# Patient Record
Sex: FEMALE | Race: BLACK OR AFRICAN AMERICAN
[De-identification: names, ages, dates, MRNs, and addresses within clinical notes are randomized per-mention and may not be internally consistent; named-entity substitution may affect disease eponyms.]

---

## 2018-06-05 NOTE — ER
Nurse's Notes                                                                                     

 [facility]                                                                

Name: Radha Blake                                                                             

Age: 27 yrs                                                                                       

Sex: Female                                                                                       

: 1990                                                                                   

MRN: [id number]                                                                                   

Arrival Date: 2018                                                                          

Time: 13:47                                                                                       

Account#: [id number]                                                                            

Bed 19                                                                                            

Private MD: Dawn Horn H                                                                    

Diagnosis: Nausea and vomiting;Diarrhea, unspecified                                              

                                                                                                  

Presentation:                                                                                     

                                                                                             

13:51 Presenting complaint: Patient states: N/V/D and body aches since 0200 today. Transition aa5 

      of care: patient was not received from another setting of care. Onset of symptoms was       

      2018. Risk Assessment: Do you want to hurt yourself or someone else? Patient       

      reports no desire to harm self or others. Initial Sepsis Screen: Does the patient meet      

      any 2 criteria? No. Patient's initial sepsis screen is negative. Does the patient have      

      a suspected source of infection? No. Patient's initial sepsis screen is negative. Care      

      prior to arrival: None.                                                                     

13:51 Method Of Arrival: Ambulatory                                                           aa5 

13:51 Acuity: MERRILL 3                                                                           aa5 

                                                                                                  

OB/GYN:                                                                                           

13:53 LMP 2018                                                                              aa5 

                                                                                                  

Historical:                                                                                       

- Allergies:                                                                                      

13:52 No Known Allergies;                                                                     aa5 

- PMHx:                                                                                           

13:53 Gestational diabetes;                                                                   aa5 

- PSHx:                                                                                           

13:52 Cholecystectomy; D \T\ C;                                                                 aa5

                                                                                                  

- Immunization history:: Adult Immunizations up to date.                                          

- Social history:: Smoking status: Patient/guardian denies using tobacco.                         

- Ebola Screening: : No symptoms or risks identified at this time.                                

                                                                                                  

                                                                                                  

Screenin:16 Abuse screen: Denies threats or abuse. Denies injuries from another. Nutritional        jl7 

      screening: No deficits noted. Tuberculosis screening: No symptoms or risk factors           

      identified. Fall Risk None identified.                                                      

                                                                                                  

Assessment:                                                                                       

14:16 General: Appears in no apparent distress. uncomfortable, Behavior is calm, cooperative, jl7 

      appropriate for age. Pain: Complains of pain in abdomen. Neuro: Level of Consciousness      

      is awake, alert, obeys commands, Oriented to person, place, time, situation.                

      Cardiovascular: Patient's skin is warm and dry. Respiratory: Airway is patent               

      Respiratory effort is even, unlabored, Respiratory pattern is regular, symmetrical. GI:     

      Abdomen is round non-distended, Stools are reported to be diarrhea. Last BM Bowel           

      sounds present X 4 quads. Reports diarrhea, nausea, vomiting, since 0200. : No signs      

      and/or symptoms were reported regarding the genitourinary system. EENT: No signs and/or     

      symptoms were reported regarding the EENT system. Derm: Skin is dry, Skin is normal,        

      Skin temperature is cool. Musculoskeletal: No signs and/or symptoms reported regarding      

      the musculoskeletal system.                                                                 

15:15 Reassessment: No changes from previously documented assessment. Patient and/or family   jl7 

      updated on plan of care and expected duration. Pain level reassessed. Patient is alert,     

      oriented x 3, equal unlabored respirations, skin warm/dry/pink.                             

16:40 Reassessment: Pt finished drinking oral contrast, CT notified.                          jl7 

17:45 Reassessment: Patient and/or family updated on plan of care and expected duration. Pain jl7 

      level reassessed. Patient is alert, oriented x 3, equal unlabored respirations, skin        

      warm/dry/pink.                                                                              

18:45 Reassessment: Patient and/or family updated on plan of care and expected duration. Pain jl7 

      level reassessed. Patient is alert, oriented x 3, equal unlabored respirations, skin        

      warm/dry/pink.                                                                              

                                                                                                  

Vital Signs:                                                                                      

13:53  / 79; Pulse 115; Resp 18; Temp 100.5(O); Pulse Ox 98% on R/A; Weight 81.65 kg    aa5 

      (R); Height 5 ft. 4 in. (162.56 cm) (R); Pain 10/10;                                        

17:50  / 64; Pulse 92; Resp 17; Temp 99.4; Pulse Ox 98% on R/A;                         mh5 

13:53 Body Mass Index 30.90 (81.65 kg, 162.56 cm)                                             aa5 

                                                                                                  

ED Course:                                                                                        

13:47 Patient arrived in ED.                                                                  sb2 

13:48 Dawn Horn DO is Private Physician.                                               sb2 

13:52 Triage completed.                                                                       aa5 

13:52 Arm band placed on.                                                                     aa5 

13:58 Noman Orlando PA is PHCP.                                                                cp  

13:58 Sebastián Almonte MD is Attending Physician.                                                cp  

14:16 Kristine De Paz, LONNIE is Primary Nurse.                                                      jl7 

14:16 Patient has correct armband on for positive identification. Bed in low position. Call   jl7 

      light in reach. Side rails up X 1. Pulse ox on. NIBP on.                                    

14:30 Initial lab(s) drawn, by me, sent to lab. Urine collected: clean catch specimen, clear. jl7 

      Inserted saline lock: 20 gauge in right antecubital area, using aseptic technique.          

      Blood collected.                                                                            

16:14 Oral contrast given.                                                                    vm2 

18:13 CT Abd/Pelvis - W/Contrast In Process Unspecified.                                      EDMS

18:38 Dawn Horn DO is Referral Physician.                                              cp  

19:00 No provider procedures requiring assistance completed. IV discontinued, intact,         jl7 

      bleeding controlled, No redness/swelling at site. Pressure dressing applied.                

                                                                                                  

Administered Medications:                                                                         

15:16 Drug: NS 0.9% 1000 ml Route: IV; Rate: 1 bolus; Site: right antecubital;                jl7 

18:04 Follow up: IV Status: Completed infusion                                                jl7 

15:35 Drug: Tylenol 1000 mg Route: PO;                                                        jl7 

18:04 Follow up: Response: No adverse reaction; Temperature is decreased                      jl7 

18:54 Drug: Potassium Effervescent Tablet 50 mEq Route: PO;                                   jl7 

19:10 Follow up: Response: Medication administered at discharge.                              jl7 

18:54 Drug: Bentyl 20 mg Route: PO;                                                           jl7 

19:10 Follow up: Response: Medication administered at discharge.                              jl7 

                                                                                                  

                                                                                                  

Outcome:                                                                                          

18:39 Discharge ordered by MD.                                                                cp  

19:00 Discharged to home ambulatory.                                                          jl7 

19:00 Condition: stable                                                                           

19:00 Discharge instructions given to patient, Instructed on discharge instructions, follow       

      up and referral plans. medication usage, Demonstrated understanding of instructions,        

      follow-up care, medications, Prescriptions given X 3.                                       

19:01 Patient left the ED.                                                                    jl7 

                                                                                                  

Signatures:                                                                                       

Dispatcher [employer]                           EDMS                                                 

Ariadne Gardiner, RN                     RN   [id number]                                                  

Noman Orlando PA                         PA   Petrona Suero                              Kristine Matute RN                        RN   jl7                                                  

Amaya Parra                            2                                                  

Kathrin Posada                                                  

                                                                                                  

**************************************************************************************************

## 2018-06-05 NOTE — EDPHYS
Physician Documentation                                                                           

 [facility]                                                                

Name: Radha Blake                                                                             

Age: 27 yrs                                                                                       

Sex: Female                                                                                       

: 1990                                                                                   

MRN: [id number]                                                                                   

Arrival Date: 2018                                                                          

Time: 13:47                                                                                       

Account#: [id number]                                                                            

Bed 19                                                                                            

Private MD: Dawn Horn H                                                                    

ED Physician Sebastián Almonte                                                                         

HPI:                                                                                              

                                                                                             

14:25 This 27 yrs old Black Female presents to ER via Ambulatory with complaints of           cp  

      Nausea/Vomiting/Diarrhea.                                                                   

14:25 The patient presents to the emergency department with nausea, that is moderate,         cp  

      vomiting, that is intermittent, diarrhea, that is intermittent, abdominal pain, of the      

      right lower quadrant and left lower quadrant. Onset: The symptoms/episode                   

      began/occurred this morning.                                                                

                                                                                                  

OB/GYN:                                                                                           

13:53 LMP 2018                                                                              aa5 

                                                                                                  

Historical:                                                                                       

- Allergies:                                                                                      

13:52 No Known Allergies;                                                                     aa5 

- PMHx:                                                                                           

13:53 Gestational diabetes;                                                                   aa5 

- PSHx:                                                                                           

13:52 Cholecystectomy; D \T\ C;                                                                 aa5

                                                                                                  

- Immunization history:: Adult Immunizations up to date.                                          

- Social history:: Smoking status: Patient/guardian denies using tobacco.                         

- Ebola Screening: : No symptoms or risks identified at this time.                                

                                                                                                  

                                                                                                  

ROS:                                                                                              

14:33 Constitutional: Positive for body aches, chills, fever.                                 cp  

14:33 Eyes: Negative for injury, pain, redness, and discharge.                                cp  

14:33 ENT: Negative for drainage from ear(s), ear pain, sore throat, difficulty swallowing,       

      difficulty handling secretions.                                                             

14:33 Cardiovascular: Negative for chest pain, edema, palpitations.                               

14:33 Respiratory: Negative for cough, shortness of breath, wheezing.                             

14:33 Abdomen/GI: Positive for abdominal pain, nausea, vomiting, and diarrhea, Negative for       

      constipation, anorexia, dysphagia, black/tarry stool, rectal bleeding.                      

14:33 Back: Negative for pain at rest, pain with movement, radiated pain.                         

14:33 : Negative for urinary symptoms, flank pain.                                              

14:33 Skin: Negative for cellulitis, rash.                                                        

14:33 Neuro: Negative for altered mental status, dizziness, headache, weakness.                   

14:33 All other systems are negative.                                                             

                                                                                                  

Exam:                                                                                             

14:40 Constitutional: The patient appears in no acute distress, alert, awake, non-toxic, well cp  

      developed, well nourished, febrile.                                                         

14:40 Head/Face:  Normocephalic, atraumatic.                                                  cp  

14:40 Eyes: Periorbital structures: appear normal, Conjunctiva: normal, no exudate, no        cp  

      injection, Sclera: no appreciated abnormality, Lids and lashes: appear normal,              

      bilaterally.                                                                                

14:40 ENT: External ear(s): are unremarkable, Ear canal(s): are normal, clear, TM's: bulging,     

      is not appreciated, bilaterally, dullness, bilaterally, erythema, is not appreciated,       

      bilaterally, Nose: is normal, Mouth: Lips: moist, Oral mucosa: pink and intact, moist,      

      Posterior pharynx: is normal, airway is patent, no erythema, no exudate, Voice: is          

      normal.                                                                                     

14:40 Neck: ROM/movement: is normal, is supple, without pain, no range of motions                 

      limitations, no nuchal rigidity.                                                            

14:40 Chest/axilla: Inspection: normal, Palpation: is normal, no crepitus, no tenderness.         

14:40 Cardiovascular: Rate: tachycardic, Rhythm: regular.                                         

14:40 Respiratory: the patient does not display signs of respiratory distress,  Respirations:     

      normal, no use of accessory muscles, no retractions, no splinting, no tachypnea,            

      labored breathing, is not present, Breath sounds: are clear throughout, no decreased        

      breath sounds, no stridor, no wheezing.                                                     

14:40 Abdomen/GI: Inspection: abdomen appears normal, Bowel sounds: active, all quadrants,        

      Palpation: soft, in all quadrants, moderate abdominal tenderness, in the right lower        

      quadrant and left lower quadrant, rebound tenderness, is not appreciated, voluntary         

      guarding, is elicited in the right lower quadrant and left lower quadrant.                  

14:40 Back: CVA tenderness, is absent.                                                            

14:40 Skin: cellulitis, is not appreciated, no rash present.                                      

                                                                                                  

Vital Signs:                                                                                      

13:53  / 79; Pulse 115; Resp 18; Temp 100.5(O); Pulse Ox 98% on R/A; Weight 81.65 kg    aa5 

      (R); Height 5 ft. 4 in. (162.56 cm) (R); Pain 10/10;                                        

17:50  / 64; Pulse 92; Resp 17; Temp 99.4; Pulse Ox 98% on R/A;                         mh5 

13:53 Body Mass Index 30.90 (81.65 kg, 162.56 cm)                                             aa5 

                                                                                                  

MDM:                                                                                              

13:58 Patient medically screened.                                                             cp  

15:00 Differential diagnosis: gastritis, appendicitis, viral gastroenteritis,                 cp  

      gastroenteritis, UTI.                                                                       

18:00 Data reviewed: vital signs, nurses notes, lab test result(s).                           cp  

                                                                                                  

06/                                                                                             

14:20 Order name: Urine Dipstick--Ancillary (enter results); Complete Time: 14:36             bd  

                                                                                             

18:28 Interpretation: Normal except: UKET 4+; UBLD TRACE.                                     cp  

06                                                                                             

14:36 Order name: Amylase, Serum; Complete Time: 16:09                                        cp  

06                                                                                             

14:36 Order name: Basic Metabolic Panel; Complete Time: 16:09                                 cp  

                                                                                             

14:36 Order name: CBC with Diff; Complete Time: 16:09                                         cp  

                                                                                             

14:36 Order name: Creatinine for Radiology; Complete Time: 16:09                              cp  

/                                                                                             

14:36 Order name: Hepatic Function; Complete Time: 16:09                                      cp  

/                                                                                             

14:36 Order name: Lipase; Complete Time: 16:09                                                cp  

                                                                                             

14:36 Order name: Urine Microscopic Only; Complete Time: 16:09                                cp  

/05                                                                                             

15:09 Order name: CBC Smear Scan; Complete Time: 16:09                                        EDMS

06/05                                                                                             

15:41 Order name: Urine Pregnancy--Ancillary (enter results); Complete Time: 18:28            bd  

                                                                                             

16:10 Order name: CT Abd/Pelvis - W/Contrast; Complete Time: 18:36                            cp  

06/05                                                                                             

14:36 Order name: Urine Pregnancy Test (obtain specimen); Complete Time: 15:37                cp  

06/05                                                                                             

14:36 Order name: IV Saline Lock; Complete Time: 15:37                                        cp  

06/05                                                                                             

14:36 Order name: Labs collected and sent; Complete Time: 15:37                               cp  

06/05                                                                                             

14:36 Order name: Urine Dipstick-Ancillary (obtain specimen); Complete Time: 15:37            cp  

06/05                                                                                             

16:10 Order name: PO challenge; Complete Time: 16:53                                          cp  

                                                                                                  

Administered Medications:                                                                         

15:16 Drug: NS 0.9% 1000 ml Route: IV; Rate: 1 bolus; Site: right antecubital;                jl7 

18:04 Follow up: IV Status: Completed infusion                                                jl7 

15:35 Drug: Tylenol 1000 mg Route: PO;                                                        jl7 

18:04 Follow up: Response: No adverse reaction; Temperature is decreased                      jl7 

18:54 Drug: Potassium Effervescent Tablet 50 mEq Route: PO;                                   jl7 

19:10 Follow up: Response: Medication administered at discharge.                              jl7 

18:54 Drug: Bentyl 20 mg Route: PO;                                                           jl7 

19:10 Follow up: Response: Medication administered at discharge.                              jl7 

                                                                                                  

                                                                                                  

Disposition:                                                                                      

                                                                                             

07:07 Co-signature as Attending Physician, Sebastián Almonte MD.                                    rn  

                                                                                                  

Disposition:                                                                                      

18 18:39 Discharged to Home. Impression: Nausea and vomiting, Diarrhea, unspecified.        

- Condition is Stable.                                                                            

- Discharge Instructions: Food Choices to Help Relieve Diarrhea, Adult, Dehydration,              

  Adult, Diarrhea, Nausea and Vomiting, Hypokalemia.                                              

- Prescriptions for Bentyl 20 mg Oral Tablet - take 1 tablet by ORAL route every 6                

  hours As needed; 30 tablet. Zofran 4 mg Oral Tablet - take 1 tablet by ORAL route               

  every 12 hours As needed; 20 tablet. Potassium Chloride 10 mEq Oral Capsule,                    

  Sustained Release - take 1 tablet by ORAL route every 12 hours for 3 days; 6 tablet.            

- Work release form, Medication Reconciliation Form, Thank You Letter, Antibiotic                 

  Education, Prescription Opioid Use form.                                                        

- Follow up: Justina, DO Dawn; When: 2 - 3 days; Reason: Recheck today's complaints.           

- Problem is new.                                                                                 

- Symptoms have improved.                                                                         

                                                                                                  

                                                                                                  

                                                                                                  

Signatures:                                                                                       

Dispatcher [employer]                           EDSebastián Negrete MD MD rn Calderon, Audri, RN                     RN   aa5                                                  

Noman Orlando PA                         PA   Kristine Myers RN                        RN   jl7                                                  

                                                                                                  

Corrections: (The following items were deleted from the chart)                                    

                                                                                             

19:01 18:39 2018 18:39 Discharged to Home. Impression: Nausea and vomiting; Diarrhea,   jl7 

      unspecified. Condition is Stable. Forms are Medication Reconciliation Form, Thank You       

      Letter, Antibiotic Education, Prescription Opioid Use. Follow up: Dawn Horn; When:     

      2 - 3 days; Reason: Recheck today's complaints. Problem is new. Symptoms have improved.     

      cp                                                                                          

                                                                                                  

**************************************************************************************************

## 2018-06-05 NOTE — RAD REPORT
EXAM DESCRIPTION:  CT - Abdomen   Pelvis W Contrast - 6/5/2018 6:12 pm

 

CLINICAL HISTORY:   Abdominal pain. Diarrhea with nausea and vomiting

 

COMPARISON:  None.

 

TECHNIQUE:  Computed axial tomography of the abdomen and pelvis was obtained. 100 cc Isovue-300 is ad
ministered intravenously. Oral contrast was given.

 

All CT scans are performed using dose optimization technique as appropriate and may include automated
 exposure control or mA/KV adjustment according to patient size.

 

FINDINGS:

The liver, spleen, pancreas, adrenals and kidneys appear unremarkable.

 

The appendix is normal caliber. There is no evidence of diverticulitis

 

The gallbladder has been removed 1

 

Fluid is present within nondilated large and small bowel.

 

 

IMPRESSION:   Fluid within nondilated large and small bowel may indicate an enteritis

## 2018-11-19 ENCOUNTER — HOSPITAL ENCOUNTER (EMERGENCY)
Dept: HOSPITAL 97 - ER | Age: 28
Discharge: HOME | End: 2018-11-19
Payer: COMMERCIAL

## 2018-11-19 VITALS — TEMPERATURE: 98.4 F

## 2018-11-19 VITALS — SYSTOLIC BLOOD PRESSURE: 111 MMHG | OXYGEN SATURATION: 99 % | DIASTOLIC BLOOD PRESSURE: 56 MMHG

## 2018-11-19 DIAGNOSIS — K52.9: Primary | ICD-10-CM

## 2018-11-19 DIAGNOSIS — Z72.0: ICD-10-CM

## 2018-11-19 LAB
ALBUMIN SERPL BCP-MCNC: 3.5 G/DL (ref 3.4–5)
ALP SERPL-CCNC: 84 U/L (ref 45–117)
ALT SERPL W P-5'-P-CCNC: 26 U/L (ref 12–78)
AST SERPL W P-5'-P-CCNC: 23 U/L (ref 15–37)
BUN BLD-MCNC: 12 MG/DL (ref 7–18)
GLUCOSE SERPLBLD-MCNC: 82 MG/DL (ref 74–106)
HCT VFR BLD CALC: 37.3 % (ref 36–45)
LIPASE SERPL-CCNC: 486 U/L (ref 73–393)
LYMPHOCYTES # SPEC AUTO: 2.3 K/UL (ref 0.7–4.9)
MCH RBC QN AUTO: 28.2 PG (ref 27–35)
MCV RBC: 84.5 FL (ref 80–100)
PMV BLD: 7.7 FL (ref 7.6–11.3)
POTASSIUM SERPL-SCNC: 4.2 MMOL/L (ref 3.5–5.1)
RBC # BLD: 4.42 M/UL (ref 3.86–4.86)

## 2018-11-19 PROCEDURE — 81025 URINE PREGNANCY TEST: CPT

## 2018-11-19 PROCEDURE — 99284 EMERGENCY DEPT VISIT MOD MDM: CPT

## 2018-11-19 PROCEDURE — 85025 COMPLETE CBC W/AUTO DIFF WBC: CPT

## 2018-11-19 PROCEDURE — 81003 URINALYSIS AUTO W/O SCOPE: CPT

## 2018-11-19 PROCEDURE — 83690 ASSAY OF LIPASE: CPT

## 2018-11-19 PROCEDURE — 36415 COLL VENOUS BLD VENIPUNCTURE: CPT

## 2018-11-19 PROCEDURE — 96361 HYDRATE IV INFUSION ADD-ON: CPT

## 2018-11-19 PROCEDURE — 80076 HEPATIC FUNCTION PANEL: CPT

## 2018-11-19 PROCEDURE — 74177 CT ABD & PELVIS W/CONTRAST: CPT

## 2018-11-19 PROCEDURE — 96374 THER/PROPH/DIAG INJ IV PUSH: CPT

## 2018-11-19 PROCEDURE — 80048 BASIC METABOLIC PNL TOTAL CA: CPT

## 2018-11-19 NOTE — EDPHYS
Physician Documentation                                                                           

 [facility]                                                                

Name: Radha Blake                                                                             

Age: 27 yrs                                                                                       

Sex: Female                                                                                       

: 1990                                                                                   

MRN: [id number]                                                                                   

Arrival Date: 2018                                                                          

Time: 11:08                                                                                       

Account#: [id number]                                                                            

Bed 8                                                                                             

Private MD:                                                                                       

ED Physician Noman Muñoz                                                                      

HPI:                                                                                              

                                                                                             

14:46 This 27 yrs old Black Female presents to ER via Ambulatory with complaints of Abdominal jr8 

      Cramping, Diarrhea.                                                                         

14:46 The patient presents with abdominal pain that is diffuse. Onset: The symptoms/episode   jr8 

      began/occurred acutely, yesterday. The symptoms do not radiate. Associated signs and        

      symptoms: Pertinent positives: nausea, vomiting, and diarrhea. The symptoms are             

      described as crampy. Modifying factors: The symptoms are alleviated by nothing, the         

      symptoms are aggravated by food. Severity of pain: At its worst the pain was mild in        

      the emergency department the pain is unchanged. The patient has not experienced similar     

      symptoms in the past. The patient has not recently seen a physician.                        

                                                                                                  

Historical:                                                                                       

- Allergies:                                                                                      

11:12 No Known Allergies;                                                                     sv  

- PMHx:                                                                                           

11:12 gestational diabetes;                                                                   sv  

- PSHx:                                                                                           

11:12 Cholecystectomy; D \T\ C;                                                                 sv

                                                                                                  

- Immunization history:: Flu vaccine is not up to date.                                           

- Social history:: Smoking status: Patient uses tobacco products, denies chronic                  

  smoking, but will smoke occasionally.                                                           

- Ebola Screening: : No symptoms or risks identified at this time.                                

                                                                                                  

                                                                                                  

ROS:                                                                                              

14:46 Eyes: Negative for injury, pain, redness, and discharge, ENT: Negative for injury,      jr8 

      pain, and discharge, Neck: Negative for injury, pain, and swelling, Cardiovascular:         

      Negative for chest pain, palpitations, and edema, Respiratory: Negative for shortness       

      of breath, cough, wheezing, and pleuritic chest pain, Back: Negative for injury and         

      pain, MS/Extremity: Negative for injury and deformity, Skin: Negative for injury, rash,     

      and discoloration, Neuro: Negative for headache, weakness, numbness, tingling, and          

      seizure.                                                                                    

14:46 Abdomen/GI: Positive for abdominal pain, nausea, vomiting, and diarrhea, abdominal          

      cramps.                                                                                     

                                                                                                  

Exam:                                                                                             

14:46 Eyes:  Pupils equal round and reactive to light, extra-ocular motions intact.  Lids and jr8 

      lashes normal.  Conjunctiva and sclera are non-icteric and not injected.  Cornea within     

      normal limits.  Periorbital areas with no swelling, redness, or edema. ENT:  Nares          

      patent. No nasal discharge, no septal abnormalities noted.  Tympanic membranes are          

      normal and external auditory canals are clear.  Oropharynx with no redness, swelling,       

      or masses, exudates, or evidence of obstruction, uvula midline.  Mucous membranes           

      moist. Neck:  Trachea midline, no thyromegaly or masses palpated, and no cervical           

      lymphadenopathy.  Supple, full range of motion without nuchal rigidity, or vertebral        

      point tenderness.  No Meningismus. Cardiovascular:  Regular rate and rhythm with a          

      normal S1 and S2.  No gallops, murmurs, or rubs.  Normal PMI, no JVD.  No pulse             

      deficits. Respiratory:  Lungs have equal breath sounds bilaterally, clear to                

      auscultation and percussion.  No rales, rhonchi or wheezes noted.  No increased work of     

      breathing, no retractions or nasal flaring. Back:  No spinal tenderness.  No                

      costovertebral tenderness.  Full range of motion. Skin:  Warm, dry with normal turgor.      

      Normal color with no rashes, no lesions, and no evidence of cellulitis. MS/ Extremity:      

      Pulses equal, no cyanosis.  Neurovascular intact.  Full, normal range of motion. Neuro:     

       Awake and alert, GCS 15, oriented to person, place, time, and situation.  Cranial          

      nerves II-XII grossly intact.  Motor strength 5/5 in all extremities.  Sensory grossly      

      intact.  Cerebellar exam normal.  Normal gait.                                              

14:46 Abdomen/GI: Inspection: abdomen appears normal, Bowel sounds: active, all quadrants,        

      Palpation: soft, in all quadrants, mild abdominal tenderness, in the mid upper abdomen,     

      mass, is not appreciated, rebound tenderness, is not appreciated, voluntary guarding,       

      is not appreciated, involuntary guarding, is not appreciated, no appreciated                

      organomegaly, Indicators: McBurney's point is not tender, Kurtz's sign is negative,        

      Rovsing's sign is negative, Liver: no appreciated palpable abnormalities, tenderness,       

      is not appreciated.                                                                         

                                                                                                  

Vital Signs:                                                                                      

11:12  / 70; Pulse 54; Resp 16; Temp 98.4; Pulse Ox 100% ; Weight 77.11 kg; Height 5    sv  

      ft. 4 in. (162.56 cm); Pain 5/10;                                                           

13:58  / 56; Pulse 51; Resp 18; Pulse Ox 99% on R/A;                                    em  

11:12 Body Mass Index 29.18 (77.11 kg, 162.56 cm)                                             sv  

                                                                                                  

MDM:                                                                                              

12:48 Patient medically screened.                                                             [facility] 

14:46 Data reviewed: vital signs, nurses notes, lab test result(s), radiologic studies, CT    [facility] 

      scan, and as a result, I will discharge patient. Data interpreted: Pulse oximetry: on       

      room air is 99 %. Interpretation: normal. Counseling: I had a detailed discussion with      

      the patient and/or guardian regarding: the historical points, exam findings, and any        

      diagnostic results supporting the discharge/admit diagnosis, lab results, radiology         

      results, the need for outpatient follow up, a family practitioner, to return to the         

      emergency department if symptoms worsen or persist or if there are any questions or         

      concerns that arise at home. Response to treatment: the patient's symptoms have             

      markedly improved after treatment.                                                          

                                                                                                  

                                                                                             

12:48 Order name: Basic Metabolic Panel                                                       [facility] 

                                                                                             

12:48 Order name: CBC with Diff                                                               [facility] 

                                                                                             

12:48 Order name: Creatinine for Radiology                                                    [facility] 

                                                                                             

12:48 Order name: Hepatic Function                                                            [facility] 

                                                                                             

12:48 Order name: Lipase                                                                      [facility] 

                                                                                             

13:27 Order name: Urine Dipstick--Ancillary (enter results)                                     

                                                                                             

13:27 Order name: Urine Pregnancy--Ancillary (enter results)                                    

                                                                                             

13:38 Order name: Urine Pregnancy--Ancillary; Complete Time: 14:07                            [facility]

                                                                                             

13:38 Order name: Urine Dipstick-Ancillary; Complete Time: 14:07                              [facility]

                                                                                             

13:39 Order name: CBC with Automated Diff; Complete Time: 14:07                               [facility]

                                                                                             

13:54 Order name: Basic Metabolic Panel; Complete Time: 14:07                                 [facility]

                                                                                             

13:54 Order name: Liver (Hepatic) Function; Complete Time: 14:07                              [facility]

                                                                                             

13:54 Order name: Lipase; Complete Time: 14:07                                                [facility]

                                                                                             

13:54 Order name: Creatinine (Radiology Only); Complete Time: 14:07                           [facility]

                                                                                             

12:48 Order name: IV Saline Lock; Complete Time: 13:21                                        [facility] 

                                                                                             

12:48 Order name: Labs collected and sent; Complete Time: 13:21                               [facility] 

                                                                                             

12:48 Order name: Urine Pregnancy Test (obtain specimen); Complete Time: 13:21                8 

                                                                                             

12:48 Order name: Urine Dipstick-Ancillary (obtain specimen); Complete Time: 13:21            8 

                                                                                             

14:08 Order name: CT Abd/Pelvis - W/Contrast                                                                                                                                               

14:43 Order name: CT; Complete Time: 14:45                                                    [facility]

                                                                                                  

Administered Medications:                                                                         

13:56 Drug: Zofran 4 mg Route: IVP; Site: right antecubital;                                  bp  

14:18 Follow up: Response: No adverse reaction                                                em  

13:56 Drug: NS 0.9% 1000 ml Route: IV; Rate: 1000 ml; Site: right antecubital;                em  

14:53 Follow up: IV Status: Completed infusion; IV Intake: 1000ml                             em  

                                                                                                  

                                                                                                  

Disposition:                                                                                      

17:10 Co-signature as Attending Physician, Noman Muñoz MD I agree with the assessment and  stephany 

      plan of care.                                                                               

                                                                                                  

Disposition:                                                                                      

18 14:51 Discharged to Home. Impression: Acute Gastroenteritis.                             

- Condition is Stable.                                                                            

- Discharge Instructions: Viral Gastroenteritis, Adult.                                           

- Prescriptions for Bentyl 20 mg Oral Tablet - take 1 tablet by ORAL route every 6                

  hours As needed; 20 tablet. Zofran 4 mg Oral Tablet - take 1 tablet by ORAL route               

  every 12 hours As needed; 20 tablet.                                                            

- Work release form, Medication Reconciliation Form, Thank You Letter, Antibiotic                 

  Education, Prescription Opioid Use form.                                                        

- Follow up: Private Physician; When: 2 - 3 days; Reason: Recheck today's complaints,             

  Continuance of care, Re-evaluation by your physician.                                           

- Problem is new.                                                                                 

- Symptoms have improved.                                                                         

                                                                                                  

                                                                                                  

                                                                                                  

Signatures:                                                                                       

Dispatcher [facility]                           Emmie Barraza, Noman Khoury RN, MD MD cha Munoz, Edgar, LVN                       LVN  em                                                   

Severino Steward PA                        PA   jr8                                                  

Ozzie Carcamo, RN                      RN   bp                                                   

                                                                                                  

Corrections: (The following items were deleted from the chart)                                    

15:11 14:51 2018 14:51 Discharged to Home. Impression: Acute Gastroenteritis. Condition em  

      is Stable. Forms are Medication Reconciliation Form, Thank You Letter, Antibiotic           

      Education, Prescription Opioid Use. Follow up: Private Physician; When: 2 - 3 days;         

      Reason: Recheck today's complaints, Continuance of care, Re-evaluation by your              

      physician. Problem is new. Symptoms have improved. jr8                                      

                                                                                                  

**************************************************************************************************

## 2018-11-19 NOTE — ER
Nurse's Notes                                                                                     

 [facility]                                                                

Name: Radha Blake                                                                             

Age: 27 yrs                                                                                       

Sex: Female                                                                                       

: 1990                                                                                   

MRN: [id number]                                                                                   

Arrival Date: 2018                                                                          

Time: 11:08                                                                                       

Account#: [id number]                                                                            

Bed 8                                                                                             

Private MD:                                                                                       

Diagnosis: Acute Gastroenteritis                                                                  

                                                                                                  

Presentation:                                                                                     

                                                                                             

11:11 Presenting complaint: Patient states: abd cramping, n/v/d started today. Transition of  sv  

      care: patient was not received from another setting of care. Onset of symptoms was          

      2018. Care prior to arrival: None.                                             

11:11 Method Of Arrival: Ambulatory                                                           sv  

11:11 Acuity: MERRILL 3                                                                           sv  

13:24 Risk Assessment: Do you want to hurt yourself or someone else? Patient reports no       em  

      desire to harm self or others. Initial Sepsis Screen: Does the patient meet any 2           

      criteria? No. Patient's initial sepsis screen is negative. Does the patient have a          

      suspected source of infection? No. Patient's initial sepsis screen is negative.             

                                                                                                  

Triage Assessment:                                                                                

11:14 General: Appears in no apparent distress. uncomfortable, Behavior is calm, cooperative, sv  

      appropriate for age. Pain: Complains of pain in abdomen Pain currently is 5 out of 10       

      on a pain scale. Neuro: Level of Consciousness is awake, alert, obeys commands,             

      Oriented to person, place, time, situation, Moves all extremities. Full function Gait       

      is steady. Respiratory: Respiratory effort is even, unlabored, Respiratory pattern is       

      regular, symmetrical. GI: Reports cramping, diarrhea, nausea, vomiting.                     

                                                                                                  

Historical:                                                                                       

- Allergies:                                                                                      

11:12 No Known Allergies;                                                                     sv  

- PMHx:                                                                                           

11:12 gestational diabetes;                                                                   sv  

- PSHx:                                                                                           

11:12 Cholecystectomy; D \T\ C;                                                                 sv

                                                                                                  

- Immunization history:: Flu vaccine is not up to date.                                           

- Social history:: Smoking status: Patient uses tobacco products, denies chronic                  

  smoking, but will smoke occasionally.                                                           

- Ebola Screening: : No symptoms or risks identified at this time.                                

                                                                                                  

                                                                                                  

Screenin:52 Abuse screen: Denies threats or abuse. Nutritional screening: No deficits noted.        em  

      Tuberculosis screening: No symptoms or risk factors identified. Fall Risk None              

      identified.                                                                                 

                                                                                                  

Assessment:                                                                                       

12:50 General: Appears in no apparent distress. comfortable, Behavior is calm, cooperative,   em  

      Denies fever. Pain: Complains of pain in umbilical area Pain currently is 6 out of 10       

      on a pain scale. Quality of pain is described as crampy, Pain began 0200 today. Neuro:      

      Level of Consciousness is awake, alert, obeys commands, Oriented to person, place,          

      time, situation. Cardiovascular: Denies chest pain, Capillary refill < 3 seconds            

      Patient's skin is warm and dry. Respiratory: Airway is patent Respiratory effort is         

      even, unlabored, Respiratory pattern is regular, symmetrical. GI: Abdomen is flat,          

      Bowel sounds present X 4 quads. Abd is soft X 4 quads Abdomen is tender to palpation X      

      4 quads. Reports diarrhea, nausea, vomiting, Patient currently denies bloody stool. :     

      Urine is clear, Denies burning with urination. EENT: Reports javon .ear drainage for          

      several weeks. Derm: Skin is intact, is healthy with good turgor, Skin is pink, warm \T\    

      dry. Musculoskeletal: Capillary refill < 3 seconds, Range of motion: intact in all          

      extremities.                                                                                

13:10 Reassessment: Patient appears in no apparent distress at this time. I agree with above  iw  

      assessment by Ayo Lo LVN.                                                             

13:57 Reassessment: Patient appears in no apparent distress at this time. Patient and/or      em  

      family updated on plan of care and expected duration. Pain level reassessed. Patient is     

      alert, oriented x 3, equal unlabored respirations, skin warm/dry/pink. Patient states       

      feeling better. Patient states symptoms have improved.                                      

14:47 Reassessment: Patient appears in no apparent distress at this time. Patient and/or      em  

      family updated on plan of care and expected duration. Pain level reassessed. Patient is     

      alert, oriented x 3, equal unlabored respirations, skin warm/dry/pink.                      

                                                                                                  

Vital Signs:                                                                                      

11:12  / 70; Pulse 54; Resp 16; Temp 98.4; Pulse Ox 100% ; Weight 77.11 kg; Height 5    sv  

      ft. 4 in. (162.56 cm); Pain 5/10;                                                           

13:58  / 56; Pulse 51; Resp 18; Pulse Ox 99% on R/A;                                    em  

11:12 Body Mass Index 29.18 (77.11 kg, 162.56 cm)                                             sv  

                                                                                                  

ED Course:                                                                                        

11:08 Patient arrived in ED.                                                                  mr  

11:12 Triage completed.                                                                       sv  

11:15 Arm band placed on Patient placed in waiting room, Patient notified of wait time.       sv  

12:48 Severino Steward PA is PHCP.                                                               [id number] 

12:48 Noman Muñoz MD is Attending Physician.                                             [id number] 

12:50 Ayo Lo LVN is Primary Nurse.                                                     em  

12:52 Patient has correct armband on for positive identification. Bed in low position. Call   em  

      light in reach. Side rails up X2.                                                           

13:15 Initial lab(s) drawn, by me, sent to lab. Urine collected: clean catch specimen, clear. em  

      Inserted saline lock: 20 gauge in right antecubital area, using aseptic technique.          

      Blood collected.                                                                            

14:29 CT completed. Patient tolerated procedure well. Patient moved to CT via wheelchair.       

      Patient moved back from CT.                                                                 

14:47 No provider procedures requiring assistance completed.                                  em  

14:52 IV discontinued, intact, bleeding controlled, No redness/swelling at site. Pressure     em  

      dressing applied.                                                                           

                                                                                                  

Administered Medications:                                                                         

13:56 Drug: Zofran 4 mg Route: IVP; Site: right antecubital;                                  bp  

14:18 Follow up: Response: No adverse reaction                                                em  

13:56 Drug: NS 0.9% 1000 ml Route: IV; Rate: 1000 ml; Site: right antecubital;                em  

14:53 Follow up: IV Status: Completed infusion; IV Intake: 1000ml                             em  

                                                                                                  

                                                                                                  

Intake:                                                                                           

14:53 IV: 1000ml; Total: 1000ml.                                                              em  

                                                                                                  

Outcome:                                                                                          

14:51 Discharge ordered by MD.                                                                [id number] 

14:52 Discharged to home ambulatory, with family.                                             em  

14:52 Condition: good                                                                             

14:52 Discharge instructions given to patient, Instructed on discharge instructions, follow       

      up and referral plans. medication usage, Demonstrated understanding of instructions,        

      follow-up care, Prescriptions given X 2.                                                    

15:11 Patient left the ED.                                                                    em  

                                                                                                  

Signatures:                                                                                       

Emmie Cerda, RN                    LONNIE                                                      

Barbara Hernandez Susan                                                                                    

Ayo Lo LVN LVN  em                                                   

Ekaterina Alvarenga RN RN                                                      

Severino Steward PA PA   jrOzzie Cruz RN                      RN   bp                                                   

                                                                                                  

Corrections: (The following items were deleted from the chart)                                    

11:15 11:12 Pulse 54bpm; Resp 16bpm; Pulse Ox 100%; Temp 98.4F; 77.11 kg; Height 5 ft. 4 in.; sv  

      BMI: 29.1; Pain 5/10; sv                                                                    

                                                                                                  

**************************************************************************************************

## 2018-11-19 NOTE — RAD REPORT
EXAM DESCRIPTION:  CTAbdomen   Pelvis W Contrast - 11/19/2018 2:30 pm

 

CLINICAL HISTORY:  Abdominal pain.

iv only. ;Abd pain

 

COMPARISON:  Abdomen   Pelvis W Contrast dated 6/5/2018

 

TECHNIQUE:  Biphasic CT imaging of the abdomen and pelvis was performed with 100 ml non-ionic IV cont
rast.

 

All CT scans are performed using dose optimization technique as appropriate and may include automated
 exposure control or mA/KV adjustment according to patient size.

 

FINDINGS:  The lung bases are clear.Cholecystectomy clips.

 

The liver, spleen, pancreas, adrenal glands and kidneys are within normal limits.

 

No bowel obstruction, free air, free fluid or abscess.  The appendix is normal. Multiple thickened sm
all bowel loops are seen in the left abdomen. A few mildly prominent small bowel lymph nodes are pres
ent.

 

No suspicious bony findings.

 

IMPRESSION:  Several moderately thickened small bowel loops are seen in the left abdomen which may in
dicate enteritis or inflammatory bowel disease.